# Patient Record
(demographics unavailable — no encounter records)

---

## 2024-11-18 NOTE — HISTORY OF PRESENT ILLNESS
[FreeTextEntry1] : headaches [de-identified] : c/o headaches which she has always gotten and seems to be worse in the fall and winter. started mid October. Works in human resources. Not worse at work. They'll last. Takes tylenol or motrin which dulls it and it lasts 3 days. It is always on the right side. Not worsening. Had one in October and then this week and previous was May or June. Sound bothers her when it happens. No nausea. Doesn't wake her up.  Feels like she wasn't getting as much headaches when she was thinner and more active.

## 2025-05-16 NOTE — HISTORY OF PRESENT ILLNESS
[de-identified] : Pt has done well on Zepbound. Has some nausea when increased.   Her migraines have been better since weight loss.

## 2025-05-16 NOTE — HEALTH RISK ASSESSMENT
[Very Good] : ~his/her~  mood as very good [Little interest or pleasure doing things] : 1) Little interest or pleasure doing things [Feeling down, depressed, or hopeless] : 2) Feeling down, depressed, or hopeless [0] : 2) Feeling down, depressed, or hopeless: Not at all (0) [PHQ-2 Negative - No further assessment needed] : PHQ-2 Negative - No further assessment needed [None] : Patient does not have any barriers to medication adherence [Yes] : Reviewed medication list for presence of high-risk medications. [Opioids] : opioids [Never] : Never [Patient reported colonoscopy was normal] : Patient reported colonoscopy was normal [Fully functional (bathing, dressing, toileting, transferring, walking, feeding)] : Fully functional (bathing, dressing, toileting, transferring, walking, feeding) [Fully functional (using the telephone, shopping, preparing meals, housekeeping, doing laundry, using] : Fully functional and needs no help or supervision to perform IADLs (using the telephone, shopping, preparing meals, housekeeping, doing laundry, using transportation, managing medications and managing finances) [Patient reported mammogram was normal] : Patient reported mammogram was normal [[id number]] : 0 [Change in mental status noted] : No change in mental status noted [Language] : denies difficulty with language [Behavior] : denies difficulty with behavior [Learning/Retaining New Information] : denies difficulty learning/retaining new information [Handling Complex Tasks] : denies difficulty handling complex tasks [Reasoning] : denies difficulty with reasoning [Spatial Ability and Orientation] : denies difficulty with spatial ability and orientation [MammogramDate] : 06/23 [ColonoscopyDate] : 06/20

## 2025-06-19 NOTE — HISTORY OF PRESENT ILLNESS
[FreeTextEntry1] : 46 y/o mother of one, with hx of uterine fibroids, menorrhagia, anemia s/p IV infusions, s/p , hysterectomy who presents to arrange a colonoscopy.  She says her prior ophthalmologist never put in the charting her eye condition, but she says she was told that she is high risk for colon cancer.  She has left that ophthalmologist and seen another ophthalmologist she was told that they never heard such a thing of an eye condition placing her at high risk for colon cancer.  She feels well.  Started Zepbound in 2024 and lost 45 pounds.  Since Zepbound she no longer has right upper quadrant pain or migraine headaches she says overall feels physically better.  Patient denies having abdominal pain, constipation, diarrhea, loss of appetite, weight loss, melena and hematochezia.  Patient denies having heartburn, regurgitation, difficulty swallowing, painful swallowing, nausea, vomiting.  All other review of systems are negative.  Denies cardiac symptoms.     Initial office visit : She says two months ago, she started having intermittent RUQ pain. Pain is 5-6/10 on pain scale. She says the pain feel like a soreness. She says it feels like something is underneath her right ribcage - feels as if "sometimes stuck". Discomfort can last for a few days. Does not desire eating at the time of pain. She sometimes has back pain - but chronic pain - has screws in upper back.  Initially woke up from the pain but no longer She went to walk in clinic - had sonogram and blood work and told she has fatty liver. Sometimes pain is better after a bowel movement.  Her bowel movements are irregular - diarrhea at times. BMs are soft, "sludge like, white particles" in the stools at time of pain. At time of RUQ she will have 3 or 4 BM. Every now and then she is constipated. Experienced RUQ pain years ago - sonogram and "another test" that was normal.  Pt denies having heartburn, dysphagia, odynophagia, nausea, vomiting, fevers, chills, jaundice, wt loss, melena and bright red blood per rectum. Never had EGD. Never had colonoscopy No GI cancers in family. No IBD.   Office visit 2020: Patient reports second week of January she started having right upper quadrant pain. The patient says the pain was constant pain felt like a sharp pain, a soreness throughout the day. Pain is difficult to describe she says. Pain level was 4/10 pain scale. Pain did not recur the middle of the night. She denies any other symptoms such as nausea, vomiting or jaundice. At the time she was also having diarrhea where she was going to the bathroom 3 times a day. Her stools were mainly soft and loose. She denies any red blood or black blood in her stools. Normally she has regular bowel movements once a day. She has changed her diet around and has started Weight Watchers. She lost about 12 pounds in the past 6 weeks. She is also on exercising. She denies any other symptoms such as heartburn, reflux, trouble swallowing, pain upon swallowing food. She says she saw an eye doctor who told her that she has pigmentation in her right eye that maybe linked with underlying colon cancer. She denies any digestive cancers in her family. Has a Hx of anemia - uterine fibroids - menorrhagia - follows with hematology and gets iron infusion.    Office visit 10/2023: RUQ since years - intermittent - most recently the RUQ pain for one week (mainly constant - was able to sleep at night) - longest that is has lasted. Usually her RUQ pain can last for 1 to 2 days occurs, once a month. Has irregular bowel habits - mainly constipation - has had difficulty having a bowel movement. When she RUQ pain can feel drained - feels tired. Patient denies having heartburn, regurgitation, difficulty swallowing, painful swallowing, nausea, vomiting, loss of appetite, weight loss, melena and hematochezia. Over the summer was more active with walking because she had gained weight.

## 2025-06-19 NOTE — PHYSICAL EXAM
[Bowel Sounds] : normal bowel sounds [Abdomen Tenderness] : non-tender [No Masses] : no abdominal mass palpated [Abdomen Soft] : soft [Cervical Lymph Nodes Enlarged Posterior Bilaterally] : no posterior cervical lymphadenopathy [Supraclavicular Lymph Nodes Enlarged Bilaterally] : no supraclavicular lymphadenopathy [Abnormal Walk] : normal gait [No Clubbing, Cyanosis] : no clubbing or cyanosis of the fingernails [Normal Color / Pigmentation] : normal skin color and pigmentation [Normal] : oriented to person, place, and time

## 2025-06-19 NOTE — ASSESSMENT
[FreeTextEntry1] : IMPRESSION: #  She will get records from her ophthalmologist that told her she is higher risk for colon cancer - She has seen another ophthalmologist and was told they had never heard such a thing between an eye condition and high risk for colon cancer - Colonoscopy on 2020: No colon polyps. Rpt in 5 years. - Patient denies family history of gastrointestinal cancers - unsure of maternal grandfather history.   # RUQ pain since - chest tube on the right side in  - No longer with right upper quadrant pain since starting Zepbound in 2024 -  Hemoglobin 13.2, hematocrit 40.6 on May 2025 -  Comprehensive metabolic panel was normal May 2025 -  Hemoglobin A1c was normal on May 2025 - EGD by Dr. Serra on 2020: Nml esophagus. Mild antral erythema s/p bx (neg for IM, HP). Nml duodenum s/p bx (neg for celiac disease).   # Comorbidities: History of uterine fibroids, menorrhagia, anemia s/p IV infusions, s/p , hysterectomy in    PLAN: Advised if she has multiple bilateral CHRPE lesions, then she would be high risk for colon cancer she was advised to consult with another ophthalmologist.   I discussed a colonoscopy to rule out colon polyps, colorectal cancer etc. under monitored anesthesia care.  Risks such as perforation requiring surgery, colostomy, bleeding requiring blood transfusion, infection/sepsis, diverticulitis, colitis, missed colon cancer (2% to 6%), internal organ injury such as splenic hemorrhage (6000, risk thin, female gender) etc, adverse reaction to medication etc. and risks of anesthesia including cardiopulmonary compromise requiring ICU care were discussed with patient.  Alternatives were discussed (CT colonography, stool test - limitations reviewed).  Patient verbalized understanding and agrees to proceed with a colonoscopy under anesthesia.  Stop zepbound 14 days prior